# Patient Record
Sex: MALE | Race: WHITE | URBAN - METROPOLITAN AREA
[De-identification: names, ages, dates, MRNs, and addresses within clinical notes are randomized per-mention and may not be internally consistent; named-entity substitution may affect disease eponyms.]

---

## 2018-05-03 ENCOUNTER — THERAPY VISIT (OUTPATIENT)
Dept: PHYSICAL THERAPY | Facility: CLINIC | Age: 58
End: 2018-05-03
Payer: COMMERCIAL

## 2018-05-03 DIAGNOSIS — M25.512 CHRONIC LEFT SHOULDER PAIN: Primary | ICD-10-CM

## 2018-05-03 DIAGNOSIS — G89.29 CHRONIC LEFT SHOULDER PAIN: Primary | ICD-10-CM

## 2018-05-03 PROCEDURE — 97161 PT EVAL LOW COMPLEX 20 MIN: CPT | Mod: GP | Performed by: PHYSICAL THERAPIST

## 2018-05-03 PROCEDURE — 97110 THERAPEUTIC EXERCISES: CPT | Mod: GP | Performed by: PHYSICAL THERAPIST

## 2018-05-03 NOTE — MR AVS SNAPSHOT
"              After Visit Summary   5/3/2018    Wesley Méndez    MRN: [id number]           Patient Information     Date Of Birth          1960        Visit Information        Provider Department      5/3/2018 4:20 PM Art Luna PT [facility]        Today's Diagnoses     Chronic left shoulder pain    -  1       Follow-ups after your visit        Who to contact     If you have questions or need follow up information about today's clinic visit or your schedule please contact [facility] directly at [phone].  Normal or non-critical lab and imaging results will be communicated to you by [employer], letter or phone within 4 business days after the clinic has received the results. If you do not hear from us within 7 days, please contact the clinic through [employer] or phone. If you have a critical or abnormal lab result, we will notify you by phone as soon as possible.  Submit refill requests through [employer] or call your pharmacy and they will forward the refill request to us. Please allow 3 business days for your refill to be completed.          Additional Information About Your Visit        MyChart Information     [employer] lets you send messages to your doctor, view your test results, renew your prescriptions, schedule appointments and more. To sign up, go to [email or web address] . Click on \"Log in\" on the left side of the screen, which will take you to the Welcome page. Then click on \"Sign up Now\" on the right side of the page.     You will be asked to enter the access code listed below, as well as some personal information. Please follow the directions to create your username and password.     Your access code is: [email or web address]  Expires: 2018  5:04 PM     Your access code will  in 90 days. If you need help or a new code, please call your [facility] clinic or [phone].        Care EveryWhere ID "     This is your Care EveryWhere ID. This could be used by other organizations to access your [facility] medical records  [id number]         Blood Pressure from Last 3 Encounters:   No data found for BP    Weight from Last 3 Encounters:   No data found for Wt              We Performed the Following     HC PT EVAL, LOW COMPLEXITY     PINO INITIAL EVAL REPORT     THERAPEUTIC EXERCISES        Primary Care Provider Office Phone # Fax #    Carmen Jett [phone]       [place] [address] [address]  [facility] [address]        Equal Access to Services     [facility]: Hadii aad ku hadasho Soomaali, waaxda luqadaha, qaybta kaalmada adeegyada, waxay idiin hayaan adeeg gersonaramauricio mario . So [facility] [phone].    ATENCIÓN: Si habla español, tiene a quiroz disposición servicios gratuitos de asistencia lingüística. [facility] [phone].    We comply with applicable federal civil rights laws and [place] laws. We do not discriminate on the basis of race, color, national origin, age, disability, sex, sexual orientation, or gender identity.            Thank you!     Thank you for choosing [facility] PH[facility]ICAL THERAP[facility]  for your care. Our goal is always to provide you with excellent care. Hearing back from our patients is one way we can continue to improve our services. Please take a few minutes to complete the written survey that you may receive in the mail after your visit with us. Thank you!             Your Updated Medication List - Protect others around you: Learn how to safely use, store and throw away your medicines at www.disposemymeds.org.      Notice  As of 5/3/2018  5:04 PM    You have not been prescribed any medications.

## 2018-05-03 NOTE — LETTER
[facility]  [id number]  [place] [address]  [phone]    May 4, 2018    Re: Wesley Méndez   :   1960  MRN:  [id number]   REFERRING PHYSICIAN:   Carmen Jett    [facility] PH[facility]    Date of Initial Evaluation:  2018  Visits:  Rxs Used: 1  Reason for Referral:  Chronic left shoulder pain    EVALUATION SUMMARY    [facility]  Subjective:  Patient is a 57 year old male presenting with rehab left shoulder hpi.   Wesley Méndez is a 57 year old male with a left shoulder condition.  Condition occurred with:  Repetition/overuse.  Condition occurred: other.  This is a chronic condition  Pt reports onset of L shoulder pain on 18 playing a game which required him to do repetitive circular motions with his L arm. Can now feel pain if reaching behind his back and lying on his side. Saw MD for physical recently who recommended PT. .    Patient reports pain:  Anterior.    Pain is described as aching and is intermittent and reported as 2/10.   Pain is the same all the time.  Symptoms are exacerbated by using arm behind back and lying on extremity and relieved by rest.  Since onset symptoms are gradually improving.          Pertinent medical history includes:  Asthma.  Medical allergies: yes (penicillin, codeine).  Other surgeries include:  None reported.  Current medications:  Thyroid medication.  Current occupation is IT.    Primary job tasks include:  Prolonged sitting.  Barriers include:  None as reported by the patient.  Red flags:  None as reported by the patient.  Objective:  System  Shoulder Evaluation:  ROM:  AROM:    Flexion:  Left:  WNL    Right:  WNL  Extension: Left: min lossRight: WNL  Abduction:  Left: WNL   Right:  WNL  Flexion/External Rotation:  Left:  T5    Right:  T5  Extension/Internal Rotation:  Left:  T4    Right:  T7    PROM:     Extension:  Left:  Min los with ERP    Right:  WNL  Strength:    Flexion: Left:5/5   Pain:    Right: 5/5     Pain:   Abduction:  Left: 5/5   Pain:+    Right: 5/5     Pain:    Internal Rotation:  Left:5/5     Pain:    Right: 5/5     Pain:  External Rotation:   Left:5/5     Pain:   Right:5/5     Pain:    Special Tests:  normal  Palpation:  normal  Assessment/Plan:    Patient is a 57 year old male with left side shoulder complaints.    Patient has the following significant findings with corresponding treatment plan.                Diagnosis 1:  L shoulder pain/derangement  Pain -  manual therapy, self management, education, directional preference exercise and home program  Decreased ROM/flexibility - manual therapy, therapeutic exercise, therapeutic activity and home program  Decreased joint mobility - manual therapy, therapeutic exercise, therapeutic activity and home program  Inflammation - self management/home program    Therapy Evaluation Codes:   1) History comprised of:   Personal factors that impact the plan of care:      None.    Comorbidity factors that impact the plan of care are:      None.     Medications impacting care: None.  2) Examination of Body Systems comprised of:   Body structures and functions that impact the plan of care:      Shoulder.   Activity limitations that impact the plan of care are:      Reaching.  3) Clinical presentation characteristics are:   Evolving/Changing.  4) Decision-Making    Low complexity using standardized patient assessment instrument and/or measureable assessment of functional outcome.  Cumulative Therapy Evaluation is: Low complexity.  Previous and current functional limitations:  (See Goal Flow Sheet for this information)    Short term and Long term goals: (See Goal Flow Sheet for this information)   Communication ability:  Patient appears to be able to clearly communicate and understand verbal and written communication and follow directions correctly.  Treatment  Explanation - The following has been discussed with the patient:   RX ordered/plan of care  Anticipated outcomes  Possible risks and side effects  This patient would benefit from PT intervention to resume normal activities.   Rehab potential is excellent.  Frequency:  1 X week, once daily  Duration:  for 6   weeks  Discharge Plan:  Achieve all LTG.  Independent in home treatment program.  Reach maximal therapeutic benefit.    Thank you for your referral.    INQUIRIES  Therapist: Art Luna DPT   [facility] PHYSICAL THERAP[facility]  [address] [place] [address]. [address]  [facility] [address]  Phone: [phone]  Fax: [phone]

## 2018-05-03 NOTE — PROGRESS NOTES
[facility] Initial Evaluation  Subjective:  Patient is a 57 year old male presenting with rehab left shoulder hpi.   Wesley Méndez is a 57 year old male with a left shoulder condition.  Condition occurred with:  Repetition/overuse.  Condition occurred: other.  This is a chronic condition  Pt reports onset of L shoulder pain on 1/1/18 playing a game which required him to do repetitive circular motions with his L arm. Can now feel pain if reaching behind his back and lying on his side. Saw MD for physical recently who recommended PT. .    Patient reports pain:  Anterior.    Pain is described as aching and is intermittent and reported as 2/10.   Pain is the same all the time.  Symptoms are exacerbated by using arm behind back and lying on extremity and relieved by rest.  Since onset symptoms are gradually improving.          Pertinent medical history includes:  Asthma.  Medical allergies: yes (penicillin, codeine).  Other surgeries include:  None reported.  Current medications:  Thyroid medication.  Current occupation is IT.    Primary job tasks include:  Prolonged sitting.    Barriers include:  None as reported by the patient.    Red flags:  None as reported by the patient.                        Objective:  System                   Shoulder Evaluation:  ROM:  AROM:    Flexion:  Left:  WNL    Right:  WNL  Extension: Left: min lossRight: WNL  Abduction:  Left: WNL   Right:  WNL                Flexion/External Rotation:  Left:  T5    Right:  T5  Extension/Internal Rotation:  Left:  T4    Right:  T7    PROM:      Extension:  Left:  Min los with ERP    Right:  WNL                            Strength:    Flexion: Left:5/5   Pain:    Right: 5/5     Pain:     Abduction:  Left: 5/5   Pain:+    Right: 5/5     Pain:    Internal Rotation:  Left:5/5     Pain:    Right: 5/5     Pain:  External Rotation:   Left:5/5     Pain:   Right:5/5     Pain:              Special Tests:  normal      Palpation:  normal                                          General     ROS    Assessment/Plan:    Patient is a 57 year old male with left side shoulder complaints.    Patient has the following significant findings with corresponding treatment plan.                Diagnosis 1:  L shoulder pain/derangement  Pain -  manual therapy, self management, education, directional preference exercise and home program  Decreased ROM/flexibility - manual therapy, therapeutic exercise, therapeutic activity and home program  Decreased joint mobility - manual therapy, therapeutic exercise, therapeutic activity and home program  Inflammation - self management/home program    Therapy Evaluation Codes:   1) History comprised of:   Personal factors that impact the plan of care:      None.    Comorbidity factors that impact the plan of care are:      None.     Medications impacting care: None.  2) Examination of Body Systems comprised of:   Body structures and functions that impact the plan of care:      Shoulder.   Activity limitations that impact the plan of care are:      Reaching.  3) Clinical presentation characteristics are:   Evolving/Changing.  4) Decision-Making    Low complexity using standardized patient assessment instrument and/or measureable assessment of functional outcome.  Cumulative Therapy Evaluation is: Low complexity.    Previous and current functional limitations:  (See Goal Flow Sheet for this information)    Short term and Long term goals: (See Goal Flow Sheet for this information)     Communication ability:  Patient appears to be able to clearly communicate and understand verbal and written communication and follow directions correctly.  Treatment Explanation - The following has been discussed with the patient:   RX ordered/plan of care  Anticipated outcomes  Possible risks and side effects  This patient would benefit from PT intervention to resume normal activities.   Rehab potential is excellent.    Frequency:  1 X week, once  daily  Duration:  for 6   weeks  Discharge Plan:  Achieve all LTG.  Independent in home treatment program.  Reach maximal therapeutic benefit.    Please refer to the daily flowsheet for treatment today, total treatment time and time spent performing 1:1 timed codes.

## 2018-06-05 PROBLEM — G89.29 CHRONIC LEFT SHOULDER PAIN: Status: RESOLVED | Noted: 2018-05-03 | Resolved: 2018-06-05

## 2018-06-05 PROBLEM — M25.512 CHRONIC LEFT SHOULDER PAIN: Status: RESOLVED | Noted: 2018-05-03 | Resolved: 2018-06-05
